# Patient Record
Sex: MALE | Race: OTHER
[De-identification: names, ages, dates, MRNs, and addresses within clinical notes are randomized per-mention and may not be internally consistent; named-entity substitution may affect disease eponyms.]

---

## 2018-09-06 ENCOUNTER — HOSPITAL ENCOUNTER (OUTPATIENT)
Dept: HOSPITAL 80 - FIMAGING | Age: 43
End: 2018-09-06
Attending: INTERNAL MEDICINE
Payer: MEDICAID

## 2018-09-06 DIAGNOSIS — M41.85: ICD-10-CM

## 2018-09-06 DIAGNOSIS — R07.9: Primary | ICD-10-CM

## 2018-09-06 DIAGNOSIS — Z87.891: ICD-10-CM

## 2018-09-06 DIAGNOSIS — F41.9: ICD-10-CM

## 2018-09-28 ENCOUNTER — HOSPITAL ENCOUNTER (INPATIENT)
Dept: HOSPITAL 80 - FED | Age: 43
LOS: 10 days | Discharge: HOME | DRG: 885 | End: 2018-10-08
Attending: PSYCHIATRY & NEUROLOGY | Admitting: PSYCHIATRY & NEUROLOGY
Payer: MEDICAID

## 2018-09-28 DIAGNOSIS — F20.9: Primary | ICD-10-CM

## 2018-09-28 DIAGNOSIS — I10: ICD-10-CM

## 2018-09-28 DIAGNOSIS — F17.200: ICD-10-CM

## 2018-09-28 DIAGNOSIS — Z59.0: ICD-10-CM

## 2018-09-28 DIAGNOSIS — T43.506A: ICD-10-CM

## 2018-09-28 DIAGNOSIS — Z23: ICD-10-CM

## 2018-09-28 DIAGNOSIS — F12.959: ICD-10-CM

## 2018-09-28 LAB — PLATELET # BLD: 372 10^3/UL (ref 150–400)

## 2018-09-28 PROCEDURE — G0009 ADMIN PNEUMOCOCCAL VACCINE: HCPCS

## 2018-09-28 PROCEDURE — G0480 DRUG TEST DEF 1-7 CLASSES: HCPCS

## 2018-09-28 PROCEDURE — G0008 ADMIN INFLUENZA VIRUS VAC: HCPCS

## 2018-09-28 RX ADMIN — NICOTINE SCH: 21 PATCH, EXTENDED RELEASE TOPICAL at 21:29

## 2018-09-28 SDOH — ECONOMIC STABILITY - HOUSING INSECURITY: HOMELESSNESS: Z59.0

## 2018-09-28 NOTE — [id number]
TLC Discharge Disposition

 

Disposition:                  Answers:  Admit                                 

Disposition Notes:            

Notes:

In consultation with [facility] ED physician, Agus Gonzales MD and on-call psychiatrist, Mykel Marquez MD, both concurred that pt appears to meet 27-65 criteria requiring psychiatric 

hospitalization as pt appears to be gravely disabled due to a mental illness condition. 

Was patient given the         Answers:  Yes                                   

Inpatient Behavioral                                                          

Health Prohibited                                                             

Belongings List while in                                                      

the ED?                                                                       

 

Date Signed:  09/28/2018 04:36 PM

Electronically Signed By:Dylan Geronimo

## 2018-09-28 NOTE — GHP
DATE OF ADMISSION:  09/28/2018



CHIEF COMPLAINT:  The patient is a 43-year-old male brought in to the emergency department by the j[facility] on an M1 hold due to being gravely disabled.



HISTORY OF PRESENT ILLNESS:  The patient is a 43-year-old homeless male who was brought into the [facility] department by police on an M1 hold as he was found to be in a paranoid delusional state with po
or self-care.  He apparently has declined psychotropic medications in the past.  He tells me that susana ortez did go well after he purchased multiple condominiums, and it turned into a "frat boy scene."  Sin
ce then, he has been homeless.  His thought pattern is tangential and disorganized.  He denies suicid
al or homicidal thoughts.  He is originally from [place], which is where his parents live.  He is 
evaluated by the Behavioral Health team in the emergency department and is going to be admitted to [facility] inpatient Behavioral Health Unit.



PAST MEDICAL HISTORY:  

1.  Schizophrenia.

2.  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



MEDICATIONS:  Please see [facility] for completed outpatient medication list.



ALLERGIES:  He has no known drug allergies.



FAMILY HISTORY:  Reviewed and noncontributory.



SOCIAL HISTORY:  The patient is currently homeless.  He is originally from [place], which is where
 his parents currently live.  It does not sound like he has a support system here locally.  He denies
 alcohol, tobacco, or drug use.



REVIEW OF SYSTEMS:  A 10-point review of systems was performed and is negative, except as per HPI.



OBJECTIVE:  VITAL SIGNS:  Temperature 36.6, blood pressure 167/96, heart rate 107, respiratory rate 1
8.  He is 98% on room air.  GENERAL:  The patient is awake, alert, and oriented.  He appears dishevel
ed and slightly agitated. HEENT:  Head is atraumatic, normocephalic.  Pupils are equal, round, reacti
ve to light.  Extraocular movements are intact.  Oropharynx is clear.  Mucous membranes are moist.  N
JOSELITO:  Supple.  There is no JVD.  HEART:  Regular rate and rhythm, without murmur.  LUNGS:  Clear to a
uscultation bilaterally.  ABDOMEN:  Soft, nondistended, nontender.  Positive bowel sounds.  EXTREMITI
ES:  Without cyanosis, clubbing, or edema. PSYCH:  The patient is slightly agitated, tangential, diso
rganized, and disheveled, as well as delusional.



LABORATORY DATA:  CBC reveals a normal white blood cell count, hemoglobin 19.1, blood glucose of 49. 
 Otherwise, basic metabolic panel is within normal limits.  Urine drug screen is negative.  Salicylat
e, acetaminophen, and alcohol are all negative.



ASSESSMENT/PLAN:  The patient is a 43-year-old male with a history of schizophrenia and hypertension,
 who presents to the emergency department on an M1 hold after being deemed gravely disabled, found by
 police in a paranoid and delusional state.

1.  Schizophrenia.  The patient is decompensated.  He will need inpatient psychiatric stabilization. 
 Will defer further management to the psych team.

2.  Hypertension.  His blood pressure is elevated on arrival.  He may have a history of underlying hy
pertension, but given his acutely decompensated mental health state, it is likely worth repeating thi
s, and if he has persistently elevated blood pressures, will consider starting him on lisinopril.

3.  Hypoglycemia.  This is mild.  The patient is asymptomatic.  He has since eaten since arrival to [facility] ED and appears stable at this time.

4.  Erythrocytosis.  There is no indication for phlebotomy.  There may be an element of hemoconcentra
tion.  I would follow this in the outpatient setting.

5.  Disposition.  The patient will be transferred from the ED to the inpatient Behavioral Health unit
 for psychiatric stabilization.





Job #:  [id number]

## 2018-09-28 NOTE — EDPHY
H & P


Stated Complaint: M1 from [facility]





- Personal History


Current Tetanus/Diphtheria Vaccine: Unsure


Current Tetanus Diphtheria and Acellular Pertussis (TDAP): Unsure





- Medical/Surgical History


Hx Asthma: No


Hx Chronic Respiratory Disease: No


Hx Diabetes: No


Hx Cardiac Disease: No


Hx Renal Disease: No


Hx Cirrhosis: No


Hx Alcoholism: No


Hx HIV/AIDS: No


Hx Splenectomy or Spleen Trauma: No


Other PMH: schitzophrenia, htn from caffeine use





- Social History


Smoking Status: Current every day smoker


Time Seen by Provider: 09/28/18 13:04


HPI/ROS: 





CHIEF COMPLAINT:  M1 hold gravely disabled





HISTORY OF PRESENT ILLNESS:  43-year-old male history of homelessness arrives 

from [facility] on an M1 hold for paranoid delusions poor self-care 

non adherence to mental health treatment gravely disabled due to schizophrenia.

  Denies suicidal or homicidal ideation.  Denies self-injurious behavior.  

Denies burning cutting or similar behavior.





PRIMARY CARE PROVIDER:  





REVIEW OF SYSTEMS:


10 systems reviewed and negative with the exception of the elements mentioned 

in the history of present illness








PAST MEDICAL & SURGICAL  HISTORY:  Schizophrenia


SOCIAL HISTORY: Homeless    














************


PHYSICAL EXAM





(Prior to examination, patient consented to physical exam, hands were washed 

and my usual and customary physical exam procedures followed)


1) GENERAL: poorly kept foul smelling, alert and oriented.  Appears to be in no 

acute distress.


2) HEAD: Normocephalic, atraumatic


3) HEENT: Pupils equal, round, reactive to light bilaterally.  Sclera 

anicteric.  [


4) NECK: Full range of motion, no meningeal signs.


5) LUNGS: Clear auscultation bilaterally, no wheezes, no rhonchi, no 

retractions.   


6) HEART: Regular rate and rhythm, no murmur, no heave, no gallop.


7) ABDOMEN: No guarding, no rebound, no focal tenderness,


8) MUSCULOSKELETAL: Moving all extremities, no focal areas of tenderness, no 

obvious trauma.  No peripheral edema or discoloration.


9) BACK:  No obvious trauma, no visual or palpable abnormality.] 


10) SKIN: No rash, no petechiae. 


11) Psychiatric:  Patient is oriented X 3, there is no agitation.








***************





DIFFERENTIAL DIAGNOSIS:    In no particular order including but not limited to 

suicidal ideation, homicidal ideation, psychosis, doroteo, gravely disabled


 (FELECIA Cao)


Constitutional: 


 Initial Vital Signs











Temperature (C)  36.6 C   09/28/18 13:02


 


Heart Rate  107 H  09/28/18 13:02


 


Respiratory Rate  18   09/28/18 13:02


 


Blood Pressure  167/96 H  09/28/18 13:02


 


O2 Sat (%)  98   09/28/18 13:02








 











O2 Delivery Mode               Room Air














Allergies/Adverse Reactions: 


 





No Known Allergies Allergy (Unverified 09/28/18 13:08)


 











Medical Decision Making


ED Course/Re-evaluation: 





The patient was evaluated and managed by the physician's assistant.  My 

cosignature indicates that I reviewed the chart and I agree with the findings 

and plan of care as documented.  I am the secondary supervising physician. (

Larissa Carbone)





1:28 p.m.:  Patient is on an M1 hold.  Will obtain laboratory studies and 

consult with mental health evaluator.  I saw this patient independently based 

on established practice protocols.  Care of patient under supervision of 

secondary supervising physician Dr Carbone.





4:50 p.m.:  Patient has been accepted for admission admitting physician Dr. Marquez, [facility]. EMTALA paperwork 

completed. (FELECIA Cao)





- Data Points


Laboratory Results: 


 Laboratory Results





 09/28/18 13:15 





 09/28/18 13:15 





 











  09/28/18 09/28/18 09/28/18





  13:15 13:15 12:30


 


WBC    8.34 10^3/uL 10^3/uL  





    (3.80-9.50)  


 


RBC    5.92 10^6/uL 10^6/uL  





    (4.40-6.38)  


 


Hgb    19.1 g/dL H g/dL  





    (13.7-17.5)  


 


Hct    54.1 % H %  





    (40.0-51.0)  


 


MCV    91.4 fL fL  





    (81.5-99.8)  


 


MCH    32.3 pg pg  





    (27.9-34.1)  


 


MCHC    35.3 g/dL g/dL  





    (32.4-36.7)  


 


RDW    11.7 % %  





    (11.5-15.2)  


 


Plt Count    372 10^3/uL 10^3/uL  





    (150-400)  


 


MPV    10.5 fL fL  





    (8.7-11.7)  


 


Neut % (Auto)    69.1 % %  





    (39.3-74.2)  


 


Lymph % (Auto)    22.4 % %  





    (15.0-45.0)  


 


Mono % (Auto)    7.3 % %  





    (4.5-13.0)  


 


Eos % (Auto)    0.4 % L %  





    (0.6-7.6)  


 


Baso % (Auto)    0.4 % %  





    (0.3-1.7)  


 


Nucleat RBC Rel Count    0.0 % %  





    (0.0-0.2)  


 


Absolute Neuts (auto)    5.77 10^3/uL 10^3/uL  





    (1.70-6.50)  


 


Absolute Lymphs (auto)    1.87 10^3/uL 10^3/uL  





    (1.00-3.00)  


 


Absolute Monos (auto)    0.61 10^3/uL 10^3/uL  





    (0.30-0.80)  


 


Absolute Eos (auto)    0.03 10^3/uL 10^3/uL  





    (0.03-0.40)  


 


Absolute Basos (auto)    0.03 10^3/uL 10^3/uL  





    (0.02-0.10)  


 


Absolute Nucleated RBC    0.00 10^3/uL 10^3/uL  





    (0-0.01)  


 


Immature Gran %    0.4 % %  





    (0.0-1.1)  


 


Immature Gran #    0.03 10^3/uL 10^3/uL  





    (0.00-0.10)  


 


Sodium  141 mEq/L mEq/L    





   (135-145)   


 


Potassium  3.7 mEq/L mEq/L    





   (3.3-5.0)   


 


Chloride  97 mEq/L mEq/L    





   ()   


 


Carbon Dioxide  31 mEq/l mEq/l    





   (22-31)   


 


Anion Gap  13 mEq/L mEq/L    





   (8-16)   


 


BUN  13 mg/dL mg/dL    





   (7-23)   


 


Creatinine  0.8 mg/dL mg/dL    





   (0.7-1.3)   


 


Estimated GFR  > 60     





    


 


Glucose  49 mg/dL L mg/dL    





   ()   


 


Calcium  9.9 mg/dL mg/dL    





   (8.5-10.4)   


 


Salicylates  < 1.0 mg/dL L mg/dL    





   (2.0-20.0)   


 


Urine Opiates Screen      NEGATIVE 





     (NEGATIVE) 


 


Acetaminophen  < 10 mcg/mL L mcg/mL    





   (10-30)   


 


Urine Barbiturates      NEGATIVE 





     (NEGATIVE) 


 


Ur Phencyclidine Scrn      NEGATIVE 





     (NEGATIVE) 


 


Ur Amphetamine Screen      NEGATIVE 





     (NEGATIVE) 


 


U Benzodiazepines Scrn      NEGATIVE 





     (NEGATIVE) 


 


Urine Cocaine Screen      NEGATIVE 





     (NEGATIVE) 


 


U Marijuana (THC) Screen      NEGATIVE 





     (NEGATIVE) 


 


Ethyl Alcohol  < 10 mg/dL mg/dL    





   (0-10)   














Departure





- Departure


Disposition: [facility] IP


Clinical Impression: 


 Schizophrenia





Condition: Fair


Referrals: 


Tank Escobedo MD [Primary Care Provider] - As per Instructions

## 2018-09-29 RX ADMIN — NICOTINE SCH MG: 21 PATCH, EXTENDED RELEASE TOPICAL at 08:57
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
## 2018-09-29 NOTE — ASMTCMCOM
CM Note

 

CM Note                       

Notes:

Pt. and CC completed MTP, signed and placed in chart. 



Pt. reports having a court date "in a couple months". Pt. declined to elaborate on why he is going 

to court. Pt. stated he drinks alcohol "not that frequently", adding it calms him down. Pt. reports 


smoking THC "pretty frequently, but not everyday". Pt. denied SI, HI, AVH and paranoia. Pt. reports 


his last hospitalization being 15 years ago at [facility]. Pt. stated he couldn't remember his 

diagnosis and was unable to name any helpful treatments or medications. 



Pt. presents as alert, anxious,  pressured speech, appearing delayed at times, guarded, and with 

poor eye contact. Staff report pt. sleeping 9 hours. Pt. rated his SI a 0/10 with RN. Pt.'s M1 hold 


will  on 10/1/18 at 1200.

 

Date Signed:  2018 01:06 PM

Electronically Signed By:Marcia Seay
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
## 2018-09-30 RX ADMIN — NICOTINE SCH MG: 21 PATCH, EXTENDED RELEASE TOPICAL at 08:58

## 2018-09-30 RX ADMIN — NICOTINE SCH: 21 PATCH, EXTENDED RELEASE TOPICAL at 13:59

## 2018-09-30 NOTE — SOAPPROG
SOAP Progress Note


Assessment/Plan: 


Assessment:


44yo with hx szp and thc, noncompliance with treatment, and exacerbation of sxs 

resulting in recent homelessness and legal charges








18 15:18


slept 9.5hr.


Note pt more isolative today, remaining in room. Room with w water over 

bathroom floor, clothes strewn on floor. 


cooperative, good eye contact, brief responses to questions. seems a bit more 

guarded. mood "fine" affect restricted. thoughts with goal directed responses. 

denied current AH. denied SI. Noted in hallway smiling to self with head down 

as walking to room, ?internal preoccupation. 


 


PLAN:


Pt states no s/e to low dose Abilify this AM. Accepts offer for increase, even 

taking another 2mg this afternoon and then increase to 5mg qam start tomorrow. 

briefly reviewed metabolic and extrapyramidal risks with pt who expressed 

understanding and willingness to continue with treatment. 


Still interested in long-acting injectable. 





Has not allowed MICHELLE to parents. Presented to hosp apparently at encouragement 

of parents who support him financially, and have encouraged the [facility] for 

compliance, may be a conditional arrangement to continue their support. need to 

explore this.


 


Informed his M-1 would  tomorrow and I am inclined to place on [facility]. Pt 

reports he was on this at [facility] 15 yrs ago and was there for 3 months. 

Estimated stay would be about 7-10days, and pt reports he has no problems 

staying voluntary if his stay here was "that short" (comparing to [place]). Again 

asks if okay to stay voluntarily. In this case, will defer to primary team 

tomorrow, also pending collateral from family. 








Objective: 





 Vital Signs











Temp Pulse Resp BP Pulse Ox


 


 36.6 C   76   14   113/67   97 


 


 18 06:00  18 06:00  18 06:00  18 06:00  18 06:00














- Pending Discharge


Pending Discharge Within 24 Hours: No


Pending Discharge Within 48 Hours: No





ICD10 Worksheet


Patient Problems: 


 Problems











Problem Status Onset


 


Schizophrenia Acute

## 2018-09-30 NOTE — PDMN
Medical Necessity


Medical necessity: Pt meets IP criteria per MD & [facility]CG B-014-IP; est los > 2mn 

for eval/tx of acute schizophrenia exacerbation; pt gravely disabled & on M1 

hold; admit for further monitoring, safety, med management & stabilization; hx 

homelessness; per H&P & order 9/29/18

## 2018-10-01 RX ADMIN — NICOTINE SCH MG: 21 PATCH, EXTENDED RELEASE TOPICAL at 08:38
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
## 2018-10-01 NOTE — SOAPPROG
SOAP Progress Note


Assessment/Plan: 


Assessment:





Bipolar Disorder, severe, current doroteo, with mood congruent psychotic 

features.  Cannabis Use Disorder, Severe.  Non-adherence to medical treatment.  

Nicotine dependence with withdrawal.  Slight improvement noted. (see subjective/

objective note).  Patient is not safe to discharge at this time as patient 

continues to exhibit signs of doroteo, and express doroteo symptoms.  Patient 

requires continued inpatient care because of current acute doroteo, and requires 

inpatient level of care to stabilize in order to no longer be gravely disabled 

due to mental illness.  Patient could benefit from continued inpatient 

hospitalization for crisis stabilization, safety, and medication evaluation.  

Patient could benefit from STEIN as patient has history of non-adherence to oral 

medications.  STEIN to be administered on Thursday.  





Plan:





(1) Psychotropic medications: After reviewing options, risks, and benefits 

patient agrees to continue current medications and agrees to increase Abilify 

to 10 mg po QD and Abilify Maintenna 400 mg IM on Thursday.  No medication 

changes at this time as more time is needed to determine ongoing tolerability 

and efficacy.  Plan is to continue to observe patient for response and side 

effects from medications, and ongoing monitoring and evaluation.  


(2) Review with patient informed consent and recommendations for psychotropic 

medication treatment listed below


(3) Labs: A1c, liver function, fasting lipid panel


(4) Therapy: continue milieu and group therapy  


(5) Further investigation including gathering information from patients 

relatives and review of past case records to inform treatment plan.


(6) Safety/Wellness plan and follow-up outpatient appointments to be 

established prior to discharge.  Next steps are for patient to meet with care 

manager to plan a safe discharge plan and establish outpatient services for 

ongoing treatment.  


(7) Confer with inpatient treatment team regarding treatment plan.  


(8) Legal status: M1, to sign in when M1 expires


(9) Consider discharge on Friday if patient is in stable condition, safe, and 

has a safe discharge plan.   


(10) Substance abuse intervention: Cannabis





PSYCHOTROPIC MEDICATION TREATMENT INFORMED CONSENT and RECOMMENDATIONS: Review 

nature of condition, diagnosis, and prognosis.  Review nature and purpose of 

psychotropic medication treatment.  Review type of psychotropic medications 

being ordered.  Review risk and benefits of psychotropic medication treatment.  

Review probable length of time patient will need to take medications.  Review 

risk and benefits of not undergoing psychotropic medication treatment.  Review 

alternative treatments to psychotropic medications.  Review psychotropic 

medications contraindications, drug-drug interactions, side effects, and 

importance of reporting any side effects to a psychiatric provider or nurse 

during inpatient hospitalization, and upon discharge to patients psychiatric 

outpatient provider, primary care provider, or other health care professional.  

Review importance of asking a nurse, psychiatric provider, or primary care 

provider any questions or problems concerning the psychotropic medications.  

Verify patient understands the information that has been provided, and 

understands, accepts, and agrees to psychotropic medications.  





Review patients safety plan and importance of patient to report to staff while 

hospitalized if patient is ever a danger to self/others, or unable to care for 

self, and upon discharge, the importance for patient to contact [facility] or [facility], or go to the nearest emergency room, if patient is ever a 

danger to self/others, or unable to care for self.  Recommend that upon 

discharge patient establish medication management treatment with a psychiatric 

provider, establishes routine therapy appointments, and follow-up with primary 

care provider.  Verify patient understands and agrees to these recommendations.





10/01/18 09:00





Subjective: 


Following up with patient for evaluation of doroteo, psychosis, and safety.  

Patient reports, "Doing well, doing great actually.  Yes, here because just 

think I probably smoke too many cigarettes and drink too much coffee.  My 

parents want me to start a long acting medication, and I am okay with that, I 

guess.  I have been on medications in the past for bipolar."  Patient expresses 

the following psychiatric symptoms racing thoughts, pressured to keep talking.  

Patient reports taking medications as prescribed, and describes response to 

medications as good.  Patient does not report undesirable side effects from the 

medications, and agrees to continue current medications.  Patient reports 

appetite as good, and reports eating all meals.  Patient describes getting 8 

hours of sleep.  Patient agrees to Abilify 10 mg po QD and Abilify Maintenna to 

be administered on Thursday.





Objective: 





 Vital Signs











Temp Pulse Resp BP Pulse Ox


 


 36.7 C   66   16   105/62   96 


 


 10/01/18 06:00  10/01/18 06:00  10/01/18 06:00  10/01/18 06:00  10/01/18 06:00








NURSING REPORT: Consulted with nursing for update on patients progress in 

treatment.  Nurses report patient is engaged in treatment, is attending groups, 

slept 8 hours, expresses the following psychiatric symptoms: pressure to keep 

talking, racing thoughts; exhibits the following psychiatric symptoms: 

hypertalkative, hyperactivity, tangential; is eating all meals, is agreeable to 

medications and taking as prescribed with no report of side effects, with no s/

s of EPS/akathisia, and denies SI/HI, denies A/V hallucinations, and denies 

delusions.





CASE COORDINATOR UPDATE: establishing discharge plan with [facility] 

for follow-up after discharge.  Case coordinator reports patient's OP 

psychiatrist had planned to begin Abilify Maintenna 400 mg IM STEIN prior to 

patient's decompensation due to non-adherence to oral medications that led to 

this hospitalization.





MSE: The patient is a well-nourished male looking stated chronological age.  

Attire is appropriate and dress is casual and is neat.  Grooming status is 

appropriate and neat.  Ambulation is independent.  Gait is normal and 

coordinated.  Posture is normal and relaxed.  Eye contact is appropriate, and 

adequate.  Motor activity is appropriate with purposeful, organized, 

coordinated movements; with no involuntary movements.  Attitude is cooperative 

and friendly.  Patient appears distractible and does not relate well to this 

interviewer.  Language production is spontaneous.  Rate if pressured and non-

stop, rhythm is fast, and volume is loud; amount is hypertalkative.  Patient 

difficult to interrupt and interrupts this NP during interview.  Articulation 

is clear.  Patient reports mood as okay with incongruent and expansive affect.  

Patients thought process is non-linear and illogical, with loose associations, 

tangential thought.  Associations are loose.  Patient does not report suicidal/

homicidal thoughts, ideas, or plans.  Patient denies auditory, visual 

hallucinations.  Patient denies delusions.  Patient does not appear to be 

attending to internal stimuli.  Patients attention and concentration are poor.  

Patient is oriented to person, place, time, and situation.  Patients insight is 

poor.  Patients judgment is poor.  No evidence of gross cognitive dysfunction 

at any point during the interview.  No evidence of apparent dysfunction in 

recent or remote memory.





SUBSTANCE ABUSE BRIEF INTERVENTION: Brief intervention regarding the risks of 

cannabis abuse is provided to patient with goal to reduce the risk of harm that 

could result from the continued use of methamphetamine and cannabis, with the 

general aim to investigate the problem, raise awareness of problem, develop a 

solution with the patient, recommend a specific change or activity, and 

motivate the patient toward change.  Assess substance abuse behavior and give 

supportive advice about harm reduction, recommend a reduction in hazardous/at-

risk consumption patterns, and facilitate referrals for additional specialized 

treatment with care coordinator.  Intermediate goal is for the patient to quit 

use and attend NA meetings and OP substance abuse treatment.  Intervention 

focus on intermediate goals to allow for more immediate success in the 

treatment process to keep the patient motivated.  Review following with patient

: Cannabis use risks: Short-term use: impaired short-term memory, impaired 

motor coordination, altered judgement, in high doses paranoia and psychosis.  

Long-term use addiction, diminished life satisfaction and achievement, symptoms 

of chronic bronchitis, and increased risk of chronic psychosis disorders if 

predisposition to such disorders.  In withdrawal anger, aggression irritability

, anxiety and nervousness, decreased appetite or weight loss, restlessness, and 

sleep difficulties with strange dreams.  











- Time Spent With Patient


Time Spent With Patient: 


15 minutes, met with patient individually.








- Pending Discharge


Pending Discharge Within 24 Hours: No


Pending Discharge Within 48 Hours: No





ICD10 Worksheet


Patient Problems: 


 Problems











Problem Status Onset


 


Schizophrenia Acute

## 2018-10-02 RX ADMIN — NICOTINE SCH: 21 PATCH, EXTENDED RELEASE TOPICAL at 10:42
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
## 2018-10-02 NOTE — ASMTCMCOM
CM Note

 

CM Note                       

Notes:

Pt. reports feeling "pretty good". Pt. stated he "slept pretty good". Pt. reports eating 

well. Pt. stated he is attending some groups, and is going to try to attend more today. Pt. reports 


no issues with his current medications. Pt. and CC called [employer] to assist pt. in quitting 

smoking. Pt. denied SI, HI, AVH and paranoia. 



Pt. presents as alert, nervous, guarded, constricted, poor eye contact,  and at time ridged in his 

movements. Staff report pt. sleeping 13 hours, being medication complaint, and schedule for an 

Abilify shot on Thursday. 

 

Date Signed:  10/02/2018 12:20 PM

Electronically Signed By:Marcia Seay

## 2018-10-02 NOTE — SOAPPROG
SOAP Progress Note


Assessment/Plan: 


Assessment:





Bipolar Disorder, severe, current doroteo, with mood congruent psychotic 

features.  Cannabis Use Disorder, Severe.  Non-adherence to medical treatment.  

Nicotine dependence with withdrawal.  Slight improvement noted. (see subjective/

objective note).  Patient is not safe to discharge at this time as patient 

continues to exhibit signs of doroteo, and express doroteo symptoms.  Patient 

requires continued inpatient care because of current acute doroteo, and requires 

inpatient level of care to stabilize in order to no longer be gravely disabled 

due to mental illness.  Patient could benefit from continued inpatient 

hospitalization for crisis stabilization, safety, and medication evaluation.  

Patient could benefit from STEIN as patient has history of non-adherence to oral 

medications.  STEIN to be administered on Thursday.  





Plan:





(1) Psychotropic medications: After reviewing options, risks, and benefits 

patient agrees to continue current medications and agrees to Abilify Maintenna 

400 mg IM on Thursday.  No medication changes at this time as more time is 

needed to determine ongoing tolerability and efficacy.  Plan is to continue to 

observe patient for response and side effects from medications, and ongoing 

monitoring and evaluation.  


(2) Review with patient informed consent and recommendations for psychotropic 

medication treatment listed below


(3) Labs: no additional labs at this time


(4) Therapy: continue milieu and group therapy  


(5) Further investigation including gathering information from patients 

relatives and review of past case records to inform treatment plan.


(6) Safety/Wellness plan and follow-up outpatient appointments to be 

established prior to discharge.  Next steps are for patient to meet with care 

manager to plan a safe discharge plan and establish outpatient services for 

ongoing treatment.  


(7) Confer with inpatient treatment team regarding treatment plan.  


(8) Legal status: M1, to sign in voluntary when M1 expires


(9) Consider discharge on Friday if patient is in stable condition, safe, and 

has a safe discharge plan.   


(10) Substance abuse intervention: cannabis 





PSYCHOTROPIC MEDICATION TREATMENT INFORMED CONSENT and RECOMMENDATIONS: Review 

nature of condition, diagnosis, and prognosis.  Review nature and purpose of 

psychotropic medication treatment.  Review type of psychotropic medications 

being ordered.  Review risk and benefits of psychotropic medication treatment.  

Review probable length of time patient will need to take medications.  Review 

risk and benefits of not undergoing psychotropic medication treatment.  Review 

alternative treatments to psychotropic medications.  Review psychotropic 

medications contraindications, drug-drug interactions, side effects, and 

importance of reporting any side effects to a psychiatric provider or nurse 

during inpatient hospitalization, and upon discharge to patients psychiatric 

outpatient provider, primary care provider, or other health care professional.  

Review importance of asking a nurse, psychiatric provider, or primary care 

provider any questions or problems concerning the psychotropic medications.  

Verify patient understands the information that has been provided, and 

understands, accepts, and agrees to psychotropic medications.  





Review patients safety plan and importance of patient to report to staff while 

hospitalized if patient is ever a danger to self/others, or unable to care for 

self, and upon discharge, the importance for patient to contact [facility] or [facility], or go to the nearest emergency room, if patient is ever a 

danger to self/others, or unable to care for self.  Recommend that upon 

discharge patient establish medication management treatment with a psychiatric 

provider, establishes routine therapy appointments, and follow-up with primary 

care provider.  Verify patient understands and agrees to these recommendations.





10/02/18 08:52





Subjective: 


Following up with patient for evaluation of doroteo, psychosis, and safety.  

Patient reports, "Doing good."  Patient expresses the following psychiatric 

symptoms racing thoughts.  Patient reports taking medications as prescribed, 

and describes response to medications as good.  Patient does not report 

undesirable side effects from the medications, and agrees to continue current 

medications.  Patient reports appetite as good, and reports eating all meals.  

Patient describes getting 8 hours of sleep.  Patient agrees to Abilify 10 mg po 

QD and Abilify Maintenna 400 mg IM to be administered on Thursday.  Patient 

expresses history of non-adherence to oral medications and agrees to plan to 

start STEIN to reduce risk of decompensation due to not taking oral medications 

as prescribed.





Objective: 





 Vital Signs











Temp Pulse Resp BP Pulse Ox


 


 36.5 C   57 L  16   115/53 L  96 


 


 10/02/18 06:00  10/02/18 06:00  10/02/18 06:00  10/02/18 06:00  10/02/18 06:00








NURSING REPORT: Consulted with nursing for update on patients progress in 

treatment.  Nurses report patient is engaged in treatment, is attending groups, 

slept 10 hours, expresses the following psychiatric symptoms: pressure to keep 

talking, racing thoughts; exhibits the following psychiatric symptoms: 

hypertalkative, hyperactivity, tangential; is eating all meals, is agreeable to 

medications and taking as prescribed with no report of side effects, with no s/

s of EPS/akathisia, and denies SI/HI, denies A/V hallucinations, and denies 

delusions.





CASE COORDINATOR UPDATE: establishing discharge plan with [facility] 

for follow-up after discharge





MSE: The patient is a well-nourished male looking stated chronological age.  

Attire is appropriate and dress is casual and is neat.  Grooming status is 

appropriate and neat.  Ambulation is independent.  Gait is normal and 

coordinated.  Posture is normal and relaxed.  Eye contact is appropriate, and 

adequate.  Motor activity is appropriate with purposeful, organized, 

coordinated movements; with no involuntary movements.  Attitude is cooperative 

and friendly.  Patient appears distractible and does not relate well to this 

interviewer.  Language production is spontaneous.  Rate is pressured and non-

stop, rhythm is fast, and volume is loud; amount is hypertalkative.  Patient 

difficult to interrupt and often interrupts this NP during interview.  

Articulation is clear.  Patient reports mood as okay with incongruent and 

expansive affect.  Patients thought process is non-linear and illogical, with 

loose associations, tangential thought.  Associations are loose.  Patient does 

not report suicidal/homicidal thoughts, ideas, or plans.  Patient denies 

auditory, visual hallucinations.  Patient denies delusions.  Patient does not 

appear to be attending to internal stimuli.  Patients attention and 

concentration are poor.  Patient is oriented to person, place, time, and 

situation.  Patients insight is poor.  Patients judgment is poor.  No evidence 

of gross cognitive dysfunction at any point during the interview.  No evidence 

of apparent dysfunction in recent or remote memory.





COGNITIVE FUNCTION:


Retention / Recall:  repeat back these numbers:    5 1 5 0 3     /    9 6 8 3 6 

4 2  - recall without difficulty 


Abstractions: What does the statement Rolling stones gather no cook mean to you

?  "Moving on things a lot you don't become to boring or lazy."


                   How is an airplane similar to a bird?  "Both fly" 


Memory: Remember these 3 items, ask to repeat back:  Pin, Car, Duck.   - recall 

after 3 minutes without difficulty


Judgement:  If you were in a restaurant and heard a fire alarm go off, what 

would you do?  - "exit at the emergency exit"


Orientation: x4


Calculations:  3 x 7     Count backwards by 3 or 7 starting from 100  - both 

without difficulty


Knowledge: adequate for level of education





SUBSTANCE ABUSE BRIEF INTERVENTION: Brief intervention regarding the risks of 

cannabis abuse is provided to patient with goal to reduce the risk of harm that 

could result from the continued use of methamphetamine and cannabis, with the 

general aim to investigate the problem, raise awareness of problem, develop a 

solution with the patient, recommend a specific change or activity, and 

motivate the patient toward change.  Assess substance abuse behavior and give 

supportive advice about harm reduction, recommend a reduction in hazardous/at-

risk consumption patterns, and facilitate referrals for additional specialized 

treatment with care coordinator.  Intermediate goal is for the patient to quit 

use and attend NA meetings and OP substance abuse treatment.  Intervention 

focus on intermediate goals to allow for more immediate success in the 

treatment process to keep the patient motivated.  Review following with patient

: Cannabis use risks: Short-term use: impaired short-term memory, impaired 

motor coordination, altered judgement, in high doses paranoia and psychosis.  

Long-term use addiction, diminished life satisfaction and achievement, symptoms 

of chronic bronchitis, and increased risk of chronic psychosis disorders if 

predisposition to such disorders.  In withdrawal anger, aggression irritability

, anxiety and nervousness, decreased appetite or weight loss, restlessness, and 

sleep difficulties with strange dreams.  











- Time Spent With Patient


Time Spent With Patient: 


15 minutes, met with patient individually.








- Pending Discharge


Pending Discharge Within 24 Hours: No


Pending Discharge Within 48 Hours: No





ICD10 Worksheet


Patient Problems: 


 Problems











Problem Status Onset


 


Cannabis use disorder, severe, dependence Acute  


 


Nicotine dependence with withdrawal Acute  


 


Non-adherence to medical treatment Acute  


 


Severe bipolar disorder with psychotic features, mood-congruent Acute

## 2018-10-03 RX ADMIN — NICOTINE SCH: 21 PATCH, EXTENDED RELEASE TOPICAL at 08:26

## 2018-10-03 NOTE — ASMTCMCOM
CM Note

 

CM Note                       

Notes:

Client will be put on a different STEIN, per provider based off of his out-side providers knowledge 

of the client, etc.  Also, client isolates at times and would not speak to this writer today.*

 

Date Signed:  10/03/2018 03:42 PM

Electronically Signed By:Jose Jensen

## 2018-10-03 NOTE — SOAPPROG
SOAP Progress Note


Assessment/Plan: 


Assessment:





Bipolar Disorder, severe, current doroteo, with mood congruent psychotic 

features.  Cannabis Use Disorder, Severe.  Non-adherence to medical treatment.  

Nicotine dependence with withdrawal.  Slight improvement noted; notably 

improved sleep (see subjective/objective note).  Patient is not safe to 

discharge at this time as patient continues to exhibit signs of doroteo, and 

express doroteo symptoms.  Patient requires continued inpatient care because of 

current acute doroteo, and requires inpatient level of care to stabilize in order 

to no longer be gravely disabled due to mental illness.  Patient could benefit 

from continued inpatient hospitalization for crisis stabilization, safety, and 

medication evaluation.  Patient could benefit from STEIN as patient has history 

of non-adherence to oral medications.  STEIN to be administered on Thursday.  





Plan:





(1) Psychotropic medications: After reviewing options, risks, and benefits 

patient agrees to continue current medications and agrees to Abilify Maintenna 

400 mg IM on Thursday.  No medication changes at this time as more time is 

needed to determine ongoing tolerability and efficacy.  Plan is to continue to 

observe patient for response and side effects from medications, and ongoing 

monitoring and evaluation.  


(2) Review with patient informed consent and recommendations for psychotropic 

medication treatment listed below


(3) Labs: no additional labs at this time


(4) Therapy: continue milieu and group therapy  


(5) Further investigation including gathering information from patients 

relatives and review of past case records to inform treatment plan.


(6) Safety/Wellness plan and follow-up outpatient appointments to be 

established prior to discharge.  Next steps are for patient to meet with care 

manager to plan a safe discharge plan and establish outpatient services for 

ongoing treatment.  


(7) Confer with inpatient treatment team regarding treatment plan.  


(8) Legal status: voluntary 


(9) Consider discharge on Friday if patient is in stable condition, safe, and 

has a safe discharge plan.   





PSYCHOTROPIC MEDICATION TREATMENT INFORMED CONSENT and RECOMMENDATIONS: Review 

nature of condition, diagnosis, and prognosis.  Review nature and purpose of 

psychotropic medication treatment.  Review type of psychotropic medications 

being ordered.  Review risk and benefits of psychotropic medication treatment.  

Review probable length of time patient will need to take medications.  Review 

risk and benefits of not undergoing psychotropic medication treatment.  Review 

alternative treatments to psychotropic medications.  Review psychotropic 

medications contraindications, drug-drug interactions, side effects, and 

importance of reporting any side effects to a psychiatric provider or nurse 

during inpatient hospitalization, and upon discharge to patients psychiatric 

outpatient provider, primary care provider, or other health care professional.  

Review importance of asking a nurse, psychiatric provider, or primary care 

provider any questions or problems concerning the psychotropic medications.  

Verify patient understands the information that has been provided, and 

understands, accepts, and agrees to psychotropic medications.  





Review patients safety plan and importance of patient to report to staff while 

hospitalized if patient is ever a danger to self/others, or unable to care for 

self, and upon discharge, the importance for patient to contact [facility] or [facility], or go to the nearest emergency room, if patient is ever a 

danger to self/others, or unable to care for self.  Recommend that upon 

discharge patient establish medication management treatment with a psychiatric 

provider, establishes routine therapy appointments, and follow-up with primary 

care provider.  Verify patient understands and agrees to these recommendations.





10/03/18 06:42





Subjective: 


Following up with patient for evaluation of doroteo, psychosis, and safety.  

Patient reports, "Doing well, slept better last night."  Patient expresses the 

following psychiatric symptoms racing thoughts.  Patient reports taking 

medications as prescribed, and describes response to medications as "good."  

Patient does not report undesirable side effects from the medications, and 

agrees to continue current medications.  Patient reports appetite as good, and 

reports eating all meals.  Patient describes getting 8 hours of sleep.  Patient 

agrees to continue Abilify 10 mg po QD and Abilify Maintenna 400 mg IM to be 

administered on Thursday.  





Objective: 





 Vital Signs











Temp Pulse Resp BP Pulse Ox


 


 36.9 C   70   14   113/57 L  94 


 


 10/03/18 06:00  10/03/18 06:00  10/03/18 06:00  10/03/18 06:00  10/03/18 06:00








NURSING REPORT: Consulted with nursing for update on patients progress in 

treatment.  Nurses report patient is engaged in treatment, is attending groups, 

slept 8 hours, expresses the following psychiatric symptoms: pressure to keep 

talking, racing thoughts; exhibits the following psychiatric symptoms: 

hypertalkative, hyperactivity, tangential; is eating all meals, is agreeable to 

medications and taking as prescribed with no report of side effects, with no s/

s of EPS/akathisia, and denies SI/HI, denies A/V hallucinations, and denies 

delusions.





CASE COORDINATOR UPDATE: establishing discharge plan with [facility] 

for follow-up after discharge





MSE: The patient is a well-nourished male looking stated chronological age.  

Attire is appropriate and dress is casual and is neat.  Grooming status is 

appropriate and neat.  Ambulation is independent.  Gait is normal and 

coordinated.  Posture is normal and relaxed.  Eye contact is appropriate, and 

adequate.  Motor activity is appropriate with purposeful, organized, 

coordinated movements; with no involuntary movements.  Attitude is cooperative 

and friendly.  Patient appears distractible and does not relate well to this 

interviewer.  Language production is spontaneous.  Rate if pressured and non-

stop, rhythm is fast, and volume is loud; amount is hypertalkative.  Patient 

difficult to interrupt and interrupts this NP during interview.  Articulation 

is clear.  Patient reports mood as okay with incongruent and expansive affect.  

Patients thought process is non-linear and illogical, with loose associations, 

tangential thought.  Associations are loose.  Patient does not report suicidal/

homicidal thoughts, ideas, or plans.  Patient denies auditory, visual 

hallucinations.  Patient denies delusions.  Patient does not appear to be 

attending to internal stimuli.  Patients attention and concentration are poor.  

Patient is oriented to person, place, time, and situation.  Patients insight is 

poor.  Patients judgment is poor.  No evidence of gross cognitive dysfunction 

at any point during the interview.  No evidence of apparent dysfunction in 

recent or remote memory.





SUBSTANCE ABUSE BRIEF INTERVENTION: Brief intervention regarding the risks of 

cannabis abuse is provided to patient with goal to reduce the risk of harm that 

could result from the continued use of methamphetamine and cannabis, with the 

general aim to investigate the problem, raise awareness of problem, develop a 

solution with the patient, recommend a specific change or activity, and 

motivate the patient toward change.  Assess substance abuse behavior and give 

supportive advice about harm reduction, recommend a reduction in hazardous/at-

risk consumption patterns, and facilitate referrals for additional specialized 

treatment with care coordinator.  Intermediate goal is for the patient to quit 

use and attend NA meetings and OP substance abuse treatment.  Intervention 

focus on intermediate goals to allow for more immediate success in the 

treatment process to keep the patient motivated.  Review following with patient

: Cannabis use risks: Short-term use: impaired short-term memory, impaired 

motor coordination, altered judgement, in high doses paranoia and psychosis.  

Long-term use addiction, diminished life satisfaction and achievement, symptoms 

of chronic bronchitis, and increased risk of chronic psychosis disorders if 

predisposition to such disorders.  In withdrawal anger, aggression irritability

, anxiety and nervousness, decreased appetite or weight loss, restlessness, and 

sleep difficulties with strange dreams.





- Time Spent With Patient


Time Spent With Patient: 


15 minutes, met with patient individually.








- Pending Discharge


Pending Discharge Within 24 Hours: No


Pending Discharge Within 48 Hours: Yes


Pending Discharge Date: 10/05/18


Pending Discharge Time: 11:00





ICD10 Worksheet


Patient Problems: 


 Problems











Problem Status Onset


 


Cannabis use disorder, severe, dependence Acute  


 


Nicotine dependence with withdrawal Acute  


 


Non-adherence to medical treatment Acute  


 


Severe bipolar disorder with psychotic features, mood-congruent Acute

## 2018-10-04 RX ADMIN — NICOTINE SCH: 21 PATCH, EXTENDED RELEASE TOPICAL at 08:12

## 2018-10-04 RX ADMIN — PALIPERIDONE SCH MG: 3 TABLET, EXTENDED RELEASE ORAL at 08:13

## 2018-10-04 NOTE — SOAPPROG
SOAP Progress Note


Assessment/Plan: 


Assessment:





Schizophrenia.  Cannabis Use Disorder, Severe.  Non-adherence to medical 

treatment.  Nicotine dependence with withdrawal.  R/O Bipolar Disorder, severe, 

current doroteo, with mood congruent psychotic features.  Slight improvement noted

; notably improved sleep (see subjective/objective note).  After further 

observation, patient is not showing signs of doroteo no expressing symptoms of 

doroteo, and presents with s/s indicative of schizophrenia.  Change working dx to 

schizophrenia and treat accordingly.  OP psychiatrist reports history of 

schizophrenia.  Patient is not safe to discharge at this time as patient 

continues to exhibit signs of psychosis, and express psychosis symptoms.  

Patient requires continued inpatient care because of current acute psychosis, 

and requires inpatient level of care to stabilize in order to no longer be 

gravely disabled due to mental illness.  Patient could benefit from continued 

inpatient hospitalization for crisis stabilization, safety, and medication 

evaluation.  Patient could benefit from STEIN as patient has history of non-

adherence to oral medications.  STEIN to be administered on Friday.  





Plan:





(1) Psychotropic medications: After reviewing options, risks, and benefits 

patient agrees to continue current medications and agrees to STEIN on Friday.  No 

medication changes at this time as more time is needed to determine ongoing 

tolerability and efficacy.  Plan is to continue to observe patient for response 

and side effects from medications, and ongoing monitoring and evaluation.  


(2) Review with patient informed consent and recommendations for psychotropic 

medication treatment listed below


(3) Labs: no additional labs at this time


(4) Therapy: continue milieu and group therapy  


(5) Further investigation including gathering information from patients 

relatives and review of past case records to inform treatment plan.


(6) Safety/Wellness plan and follow-up outpatient appointments to be 

established prior to discharge.  Next steps are for patient to meet with care 

manager to plan a safe discharge plan and establish outpatient services for 

ongoing treatment.  


(7) Confer with inpatient treatment team regarding treatment plan.  


(8) Legal status: voluntary 


(9) Consider discharge on Friday if patient is in stable condition, safe, and 

has a safe discharge plan.   


(10) Substance abuse intervention: cannabis





PSYCHOTROPIC MEDICATION TREATMENT INFORMED CONSENT and RECOMMENDATIONS: Review 

nature of condition, diagnosis, and prognosis.  Review nature and purpose of 

psychotropic medication treatment.  Review type of psychotropic medications 

being ordered.  Review risk and benefits of psychotropic medication treatment.  

Review probable length of time patient will need to take medications.  Review 

risk and benefits of not undergoing psychotropic medication treatment.  Review 

alternative treatments to psychotropic medications.  Review psychotropic 

medications contraindications, drug-drug interactions, side effects, and 

importance of reporting any side effects to a psychiatric provider or nurse 

during inpatient hospitalization, and upon discharge to patients psychiatric 

outpatient provider, primary care provider, or other health care professional.  

Review importance of asking a nurse, psychiatric provider, or primary care 

provider any questions or problems concerning the psychotropic medications.  

Verify patient understands the information that has been provided, and 

understands, accepts, and agrees to psychotropic medications.  





Review patients safety plan and importance of patient to report to staff while 

hospitalized if patient is ever a danger to self/others, or unable to care for 

self, and upon discharge, the importance for patient to contact [facility] or [facility], or go to the nearest emergency room, if patient is ever a 

danger to self/others, or unable to care for self.  Recommend that upon 

discharge patient establish medication management treatment with a psychiatric 

provider, establishes routine therapy appointments, and follow-up with primary 

care provider.  Verify patient understands and agrees to these recommendations.





10/04/18 06:43





Subjective: 


Following up with patient for evaluation of psychosis and safety.  Patient 

reports, "Doing good.  Trying to stay out of my room so I do not yell at myself 

in the mirror, and maybe the medication change will help with that too.  God 

bless or whatever else I said yesterday, you know.  Yeah, I think it is going 

well, yep thanks."  Patient expresses the following psychiatric symptoms severe 

anxiety.  Patient denies symptoms of doroteo.  Patient reports taking medications 

as prescribed, and describes response to medications as good.  Patient does not 

report undesirable side effects from the medications, and agrees to continue 

current medications.  Patient reports appetite as good, and reports eating all 

meals.  Patient describes getting 10 hours of sleep.  Patient agrees to 

continue Invega 6 mg po QD and agrees to Invega Sustenna STEIN with first loading 

dose on Friday. 





Objective: 





 Vital Signs











Temp Pulse Resp BP Pulse Ox


 


 36.9 C   70   14   113/57 L  94 


 


 10/03/18 06:00  10/03/18 06:00  10/03/18 06:00  10/03/18 06:00  10/03/18 06:00








NURSING REPORT: Consulted with nursing for update on patients progress in 

treatment.  Nurses report patient is engaged in treatment, is attending groups, 

slept 10.5 hours, expresses the following psychiatric symptoms: anxiety; 

exhibits the following psychiatric symptoms: hypertalkative, disorganized 

thought process; is eating all meals, is agreeable to medications and taking as 

prescribed with no report of side effects, with no s/s of EPS/akathisia, and 

denies SI/HI, denies A/V hallucinations, and denies delusions.





CASE COORDINATOR UPDATE: establishing discharge plan with [facility] 

for follow-up after discharge.





CONSULT WITH OUTPATIENT PSYCHIATRIST YESTERDAY: Outpatient psychiatrist from 

[facility] recommended to begin patient on Haldol DEC or Prolixin STEIN due to history 

of severe schizophrenia.  Reports patient has done well on Risperidal CONSTA in 

the past, and has a history of non-adherence to oral medications.  Communicated 

to [facility] will begin trial of Invega STEIN.





MSE: The patient is a well-nourished male looking stated chronological age.  

Attire is appropriate and dress is casual and is neat.  Grooming status is 

appropriate and neat.  Ambulation is independent.  Gait is normal and 

coordinated.  Posture is normal and relaxed.  Eye contact is appropriate, and 

adequate.  Motor activity is appropriate with purposeful, organized, 

coordinated movements; with no involuntary movements.  Attitude is cooperative 

and friendly.  Patient appears distractible and does not relate well to this 

interviewer.  Language production is spontaneous.  Rate if pressured and non-

stop, rhythm is fast, and volume is loud; amount is hypertalkative.  Patient 

difficult to interrupt and interrupts this NP during interview.  Articulation 

is clear.  Patient reports mood as okay with incongruent and expansive affect.  

Patients thought process is disorganized, non-linear, illogical, with loose 

associations, tangential thought.  Associations are loose.  Patient does not 

report suicidal/homicidal thoughts, ideas, or plans.  Patient denies auditory, 

visual hallucinations.  Patient denies delusions.  Patient does not appear to 

be attending to internal stimuli.  Patients attention and concentration are 

poor.  Patient is oriented to person, place, time, and situation.  Patients 

insight is poor.  Patients judgment is poor.  No evidence of gross cognitive 

dysfunction at any point during the interview.  No evidence of apparent 

dysfunction in recent or remote memory.





SUBSTANCE ABUSE BRIEF INTERVENTION: Brief intervention regarding the risks of 

cannabis abuse is provided to patient with goal to reduce the risk of harm that 

could result from the continued use of methamphetamine and cannabis, with the 

general aim to investigate the problem, raise awareness of problem, develop a 

solution with the patient, recommend a specific change or activity, and 

motivate the patient toward change.  Assess substance abuse behavior and give 

supportive advice about harm reduction, recommend a reduction in hazardous/at-

risk consumption patterns, and facilitate referrals for additional specialized 

treatment with care coordinator.  Intermediate goal is for the patient to quit 

use and attend NA meetings and OP substance abuse treatment.  Intervention 

focus on intermediate goals to allow for more immediate success in the 

treatment process to keep the patient motivated.  Review following with patient

: Cannabis use risks: Short-term use: impaired short-term memory, impaired 

motor coordination, altered judgement, in high doses paranoia and psychosis.  

Long-term use addiction, diminished life satisfaction and achievement, symptoms 

of chronic bronchitis, and increased risk of chronic psychosis disorders if 

predisposition to such disorders.  In withdrawal anger, aggression irritability

, anxiety and nervousness, decreased appetite or weight loss, restlessness, and 

sleep difficulties with strange dreams.  





Patient responds well to intervention, however, reports he plans to continue 

cannabis after discharge.  Will continue to educate risks.











- Time Spent With Patient


Time Spent With Patient: 


15 minutes, met with patient individually.








- Pending Discharge


Pending Discharge Within 24 Hours: No


Pending Discharge Within 48 Hours: No





ICD10 Worksheet


Patient Problems: 


 Problems











Problem Status Onset


 


Cannabis use disorder, severe, dependence Acute  


 


Nicotine dependence with withdrawal Acute  


 


Non-adherence to medical treatment Acute  


 


Severe bipolar disorder with psychotic features, mood-congruent Acute

## 2018-10-05 RX ADMIN — PALIPERIDONE SCH MG: 3 TABLET, EXTENDED RELEASE ORAL at 07:51

## 2018-10-05 RX ADMIN — NICOTINE POLACRILEX PRN MG: 2 GUM, CHEWING BUCCAL at 07:51

## 2018-10-05 RX ADMIN — NICOTINE POLACRILEX PRN MG: 2 GUM, CHEWING BUCCAL at 16:48

## 2018-10-05 RX ADMIN — NICOTINE SCH: 21 PATCH, EXTENDED RELEASE TOPICAL at 13:21

## 2018-10-05 NOTE — ASMTCMCOM
CM Note

 

CM Note                       

Notes:

Ct. was in his room when CC checked in. Ct. just showered and his clothes were scattered on the 

floor. Ct. reports feeling "pretty good".  Ct. stated he is attending some groups. Ct. answers were 


very short and he did not want to continue engaging in a conversation.

 

Date Signed:  10/05/2018 12:42 PM

Electronically Signed By:Ayla Santana

## 2018-10-05 NOTE — SOAPPROG
SOAP Progress Note


Assessment/Plan: 


Assessment:





Schizophrenia.  Cannabis Use Disorder, Severe.  Non-adherence to medical 

treatment.  Nicotine dependence with withdrawal.  R/O Bipolar Disorder, severe, 

current doroteo, with mood congruent psychotic features.  Slight improvement noted

; notably improved sleep, and mentation has improved with switch from Abilify 

to Invega (see subjective/objective note).  After further observation, patient 

is not showing signs of doroteo, and presents with s/s of schizophrenia.  Change 

working dx to schizophrenia and treat accordingly.  OP psychiatrist reports 

history of schizophrenia.  Patient is not safe to discharge at this time as 

patient continues to exhibit signs of psychosis, and express psychosis 

symptoms.  Patient requires continued inpatient care because of current acute 

psychosis, and requires inpatient level of care to stabilize in order to no 

longer be gravely disabled due to mental illness.  Patient could benefit from 

continued inpatient hospitalization for crisis stabilization, safety, and 

medication evaluation.  Patient could benefit from STEIN as patient has history 

of non-adherence to oral medications.  STEIN to be administered on Friday with 

second loading dose on Monday.  Patient to discharge after second loading dose.

  





Plan:





(1) Psychotropic medications: After reviewing options, risks, and benefits 

patient agrees to continue current medications and agrees to STEIN today with 

second loading dose on Monday.  Will continue Invega 6 mg po QD for acute 

stabilization.  No medication changes at this time as more time is needed to 

determine ongoing tolerability and efficacy.  Plan is to continue to observe 

patient for response and side effects from medications, and ongoing monitoring 

and evaluation.  


(2) Review with patient informed consent and recommendations for psychotropic 

medication treatment listed below


(3) Labs: no additional labs at this time


(4) Therapy: continue milieu and group therapy  


(5) Further investigation including gathering information from patients 

relatives and review of past case records to inform treatment plan.


(6) Safety/Wellness plan and follow-up outpatient appointments to be 

established prior to discharge.  Next steps are for patient to meet with care 

manager to plan a safe discharge plan and establish outpatient services for 

ongoing treatment.  


(7) Confer with inpatient treatment team regarding treatment plan.  


(8) Legal status: voluntary 


(9) Consider discharge on Monday or Tuesday if patient is in stable condition, 

safe, and has a safe discharge plan.   





PSYCHOTROPIC MEDICATION TREATMENT INFORMED CONSENT and RECOMMENDATIONS: Review 

nature of condition, diagnosis, and prognosis.  Review nature and purpose of 

psychotropic medication treatment.  Review type of psychotropic medications 

being ordered.  Review risk and benefits of psychotropic medication treatment.  

Review probable length of time patient will need to take medications.  Review 

risk and benefits of not undergoing psychotropic medication treatment.  Review 

alternative treatments to psychotropic medications.  Review psychotropic 

medications contraindications, drug-drug interactions, side effects, and 

importance of reporting any side effects to a psychiatric provider or nurse 

during inpatient hospitalization, and upon discharge to patients psychiatric 

outpatient provider, primary care provider, or other health care professional.  

Review importance of asking a nurse, psychiatric provider, or primary care 

provider any questions or problems concerning the psychotropic medications.  

Verify patient understands the information that has been provided, and 

understands, accepts, and agrees to psychotropic medications.  


Review patients safety plan and importance of patient to report to staff while 

hospitalized if patient is ever a danger to self/others, or unable to care for 

self, and upon discharge, the importance for patient to contact [facility] or [facility], or go to the nearest emergency room, if patient is ever a 

danger to self/others, or unable to care for self.  Recommend that upon 

discharge patient establish medication management treatment with a psychiatric 

provider, establishes routine therapy appointments, and follow-up with primary 

care provider.  Verify patient understands and agrees to these recommendations.








10/05/18 06:50





Subjective: 


Following up with patient for evaluation of psychosis and safety.  Patient 

reports, "Doing well, just needed to reincorporate from being here, was 

stressed out.  Needed to reconstitute myself, under a lot of stress because I 

live alone or something like that.  Yes, everything is going well."  Patient 

expresses the following psychiatric symptoms severe anxiety.  Patient reports 

taking medications as prescribed, and describes response to medications as 

good.  Patient does not report undesirable side effects from the medications, 

and agrees to continue current medications.  Patient reports appetite as good, 

and reports eating all meals.  Patient describes getting 10 hours of sleep.  

Patient agrees to continue Invega 6 mg po QD and agrees to Invega Sustenna STEIN 

with first loading dose today and second loading dose on Monday.  Patient 

agrees to continued hospitalization until beginning of next week for STEIN. 





Objective: 





 Vital Signs











Temp Pulse Resp BP Pulse Ox


 


 36.7 C   84   16   103/61   95 


 


 10/04/18 06:00  10/04/18 06:00  10/04/18 06:00  10/04/18 06:00  10/04/18 06:00








NURSING REPORT: Consulted with nursing for update on patients progress in 

treatment.  Nurses report patient is engaged in treatment, is attending groups, 

slept 10 hours, expresses the following psychiatric symptoms: anxiety; exhibits 

the following psychiatric symptoms: disorganized, illogical, non-linear; is 

eating all meals, is agreeable to medications and taking as prescribed with no 

report of side effects, with no s/s of EPS/akathisia, and denies SI/HI, denies A

/V hallucinations, and denies delusions.





CASE COORDINATOR UPDATE: establishing discharge plan with [facility] 

for follow-up after discharge.





MSE: The patient is a well-nourished male looking stated chronological age.  

Attire is appropriate and dress is casual and is neat.  Grooming status is 

appropriate and neat.  Ambulation is independent.  Gait is normal and 

coordinated.  Posture is normal and relaxed.  Eye contact is appropriate, and 

adequate.  Motor activity is appropriate with purposeful, organized, 

coordinated movements; with no involuntary movements.  Attitude is cooperative 

and friendly.  Patient appears distractible and does not relate well to this 

interviewer.  Language production is spontaneous.  Rate if pressured, rhythm is 

fast, and volume loud.  Articulation is clear.  Patient reports mood as okay 

with incongruent and expansive affect.  Patients thought process is disorganized

, non-linear, illogical, with loose associations, tangential thought.  Patient 

does not report suicidal/homicidal thoughts, ideas, or plans.  Patient denies 

auditory, visual hallucinations.  Patient denies delusions.  Patient does not 

appear to be attending to internal stimuli.  Patients attention and 

concentration are poor.  Patient is oriented to person, place, time.  

Orientation to situation is absent.  Patients insight is poor.  Patients 

judgment is poor.  No evidence of gross cognitive dysfunction at any point 

during the interview.  No evidence of apparent dysfunction in recent or remote 

memory.





SUBSTANCE ABUSE BRIEF INTERVENTION: Brief intervention regarding the risks of 

cannabis abuse is provided to patient with goal to reduce the risk of harm that 

could result from the continued use of methamphetamine and cannabis, with the 

general aim to investigate the problem, raise awareness of problem, develop a 

solution with the patient, recommend a specific change or activity, and 

motivate the patient toward change.  Assess substance abuse behavior and give 

supportive advice about harm reduction, recommend a reduction in hazardous/at-

risk consumption patterns, and facilitate referrals for additional specialized 

treatment with care coordinator.  Intermediate goal is for the patient to quit 

use and attend NA meetings and OP substance abuse treatment.  Intervention 

focus on intermediate goals to allow for more immediate success in the 

treatment process to keep the patient motivated.  Review following with patient

: Cannabis use risks: Short-term use: impaired short-term memory, impaired 

motor coordination, altered judgement, in high doses paranoia and psychosis.  

Long-term use addiction, diminished life satisfaction and achievement, symptoms 

of chronic bronchitis, and increased risk of chronic psychosis disorders if 

predisposition to such disorders.  In withdrawal anger, aggression irritability

, anxiety and nervousness, decreased appetite or weight loss, restlessness, and 

sleep difficulties with strange dreams.  











- Time Spent With Patient


Time Spent With Patient: 


15 minutes, met with patient individually.








- Pending Discharge


Pending Discharge Within 24 Hours: No


Pending Discharge Within 48 Hours: No





ICD10 Worksheet


Patient Problems: 


 Problems











Problem Status Onset


 


Cannabis use disorder, severe, dependence Acute  


 


Nicotine dependence with withdrawal Acute  


 


Non-adherence to medical treatment Acute  


 


Severe bipolar disorder with psychotic features, mood-congruent Acute

## 2018-10-06 RX ADMIN — NICOTINE POLACRILEX PRN MG: 2 GUM, CHEWING BUCCAL at 17:36

## 2018-10-06 RX ADMIN — NICOTINE SCH: 21 PATCH, EXTENDED RELEASE TOPICAL at 09:12

## 2018-10-06 RX ADMIN — NICOTINE POLACRILEX PRN MG: 2 GUM, CHEWING BUCCAL at 14:47

## 2018-10-06 RX ADMIN — NICOTINE POLACRILEX PRN MG: 2 GUM, CHEWING BUCCAL at 23:06

## 2018-10-06 RX ADMIN — PALIPERIDONE SCH MG: 3 TABLET, EXTENDED RELEASE ORAL at 09:08

## 2018-10-06 RX ADMIN — NICOTINE POLACRILEX PRN MG: 2 GUM, CHEWING BUCCAL at 06:55

## 2018-10-06 NOTE — SOAPPROG
SOAP Progress Note


Assessment/Plan: 


Assessment:





Per Miguelito Sun's recent note:


Assessment:





Schizophrenia.  Cannabis Use Disorder, Severe.  Non-adherence to medical 

treatment.  Nicotine dependence with withdrawal.  R/O Bipolar Disorder, severe, 

current doroteo, with mood congruent psychotic features.  Slight improvement noted

; notably improved sleep, and mentation has improved with switch from Abilify 

to Invega (see subjective/objective note).  After further observation, patient 

is not showing signs of doroteo, and presents with s/s of schizophrenia.  Change 

working dx to schizophrenia and treat accordingly.  OP psychiatrist reports 

history of schizophrenia.  Patient is not safe to discharge at this time as 

patient continues to exhibit signs of psychosis, and express psychosis 

symptoms.  Patient requires continued inpatient care because of current acute 

psychosis, and requires inpatient level of care to stabilize in order to no 

longer be gravely disabled due to mental illness.  Patient could benefit from 

continued inpatient hospitalization for crisis stabilization, safety, and 

medication evaluation.  Patient could benefit from STEIN as patient has history 

of non-adherence to oral medications.  STEIN to be administered on Friday with 

second loading dose on Monday.  Patient to discharge after second loading dose.

  











Plan:





10/06/18 16:24


1. Patient has agreed to STEIN Invega Sustenna. He received 1st dose IM on 10/5/

18. 


2. Patient denies any SE's or physical complaints after IM med administration. 

He is tolerating PO Invega w/o any adverse effects.


3. Patient plans to f/u with Dr. Gallegos at [facility] after d/c. 


4. Voluntary


Subjective: 


Patient sitting at desk in his room. Interviewed patient with Tammy ESPINOZA, 

[place]onado. He reports that he did not keep his OP appts with Dr. Gallegos. She 

tried to get him back on Risperdal Consta per patient's report, but he did not 

keep his appointments. Patient has no plan how to maintain compliance when he's 

on Invega Sustenna. He claims he will go for f/u injections once/month. However

, given his prior h/o cannabis dependence and drug use, it seems likely he will 

continue to use recreational drugs that exacerbate his psychotic sxs and impair 

his cognition/judgment making it more likely he will not adhere to any 

treatment plan. This is a risk and concern that must be addressed by his 

outpatient treatment team at [facility]. The IP team, including the CC and PMHNP, have 

communicated with Dr. Gallegos and the [facility] staff to alert them to this risk. 





Objective: 





 Vital Signs











Temp Pulse Resp BP Pulse Ox


 


 36.6 C   83   14   111/63   96 


 


 10/06/18 06:00  10/06/18 06:00  10/06/18 06:00  10/06/18 06:00  10/06/18 06:00








MSE: Affect: Flat  Mood: "OK"  TP: Goal-directed   TC: Denies any SI/HI   

Perception: Denies AH/VH  Insight/Judgment: Poor





- Time Spent With Patient


Time Spent With Patient: 


15"








- Pending Discharge


Pending Discharge Within 24 Hours: No


Pending Discharge Within 48 Hours: Yes


Pending Discharge Date: 10/08/18 (Possible d/c on Mon, will need to f/u with [facility]

)


Pending Discharge Time: 11:00





ICD10 Worksheet


Patient Problems: 


 Problems











Problem Status Onset


 


Cannabis use disorder, severe, dependence Acute  


 


Nicotine dependence with withdrawal Acute  


 


Non-adherence to medical treatment Acute  


 


Severe bipolar disorder with psychotic features, mood-congruent Acute

## 2018-10-06 NOTE — ASMTBHDC
Notes

 

Note:                         

Notes:

The patient met with this writer and the provider; who answered and clarified the patient's 

questions regarding the prescribed medication. The patient was calm and cooperative. He was 

oriented and organized in his thought process. [facility] staff reported that the patient seemed 

preoccupied in the milieu.  

 

Date Signed:  10/06/2018 02:56 PM

Electronically Signed By:Tammy Gomez

## 2018-10-07 RX ADMIN — PALIPERIDONE SCH MG: 3 TABLET, EXTENDED RELEASE ORAL at 08:41

## 2018-10-07 RX ADMIN — NICOTINE POLACRILEX PRN MG: 2 GUM, CHEWING BUCCAL at 13:23

## 2018-10-07 RX ADMIN — NICOTINE SCH: 21 PATCH, EXTENDED RELEASE TOPICAL at 08:51

## 2018-10-07 RX ADMIN — NICOTINE POLACRILEX PRN MG: 2 GUM, CHEWING BUCCAL at 16:56

## 2018-10-07 RX ADMIN — NICOTINE POLACRILEX PRN MG: 2 GUM, CHEWING BUCCAL at 08:59

## 2018-10-07 NOTE — SOAPPROG
SOAP Progress Note


Assessment/Plan: 


Assessment:





Per Miguelito Sun's recent note:


Assessment:





Schizophrenia.  Cannabis Use Disorder, Severe.  Non-adherence to medical 

treatment.  Nicotine dependence with withdrawal.  R/O Bipolar Disorder, severe, 

current doroteo, with mood congruent psychotic features.  Slight improvement noted

; notably improved sleep, and mentation has improved with switch from Abilify 

to Invega (see subjective/objective note).  After further observation, patient 

is not showing signs of doroteo, and presents with s/s of schizophrenia.  Change 

working dx to schizophrenia and treat accordingly.  OP psychiatrist reports 

history of schizophrenia.  Patient is not safe to discharge at this time as 

patient continues to exhibit signs of psychosis, and express psychosis 

symptoms.  Patient requires continued inpatient care because of current acute 

psychosis, and requires inpatient level of care to stabilize in order to no 

longer be gravely disabled due to mental illness.  Patient could benefit from 

continued inpatient hospitalization for crisis stabilization, safety, and 

medication evaluation.  Patient could benefit from STEIN as patient has history 

of non-adherence to oral medications.  STEIN to be administered on Friday with 

second loading dose on Monday.  Patient to discharge after second loading dose.

  











Plan:





10/06/18 16:24


1. Patient has agreed to STEIN Invega Sustenna. He received 1st dose IM on 10/5/

18. 


2. Patient denies any SE's or physical complaints after IM med administration. 

He is tolerating PO Invega w/o any adverse effects.


3. Patient plans to f/u with Dr. Gallegos at [facility] after d/c. 


4. Voluntary








PLAN:


10/07/18 14:26


1. Patient appears to be responding to internal/external stimuli. He was 

gesturing and talking to himself in dining area.


2. No SE's from PO or IM Invega. No signs of EPS.


3. Next dose of Sustenna IM on 10/12/18. Will need to have appt to receive this 

2nd injection at [facility] clinic since he will likely not be in hospital. MD 

explained that patient should continue to take PO Invega for another 3 weeks. 

His OP provider may want him to stay on it longer or come off sooner. Patient 

said he understood the plan. 


4. Voluntary 


Subjective: 


Patient denies AH/VH, but staff observed him in dining room talking to himself 

and gesturing with his hands when no one else was around. He slept 7.5 hrs and 

ate 100% of meals. He denies any SI/HI. MD explained that he will need 2nd 

injection of Invega Sustenna next Friday (one week after his 1st injection). He 

will need to f/u at [facility] for this 2nd injection. CC will make an appt. MD also 

explained that patient will need to continue to take PO Invega for up to 3 

weeks after discharge. He verbalized his understanding and agreed with this 

plan. 





Objective: 





 Vital Signs











Temp Pulse Resp BP Pulse Ox


 


 36.8 C   86   14   123/56 H  96 


 


 10/07/18 06:00  10/07/18 06:00  10/07/18 06:00  10/07/18 06:00  10/07/18 06:00








MSE: Affect: Euthymic  Mood: "OK"  TP: Goal-directed   TC: Denies any SI/HI  

Perception: Denies AH/VH, but staff noted patient has been responding to IS/ES  

Insight/Judgment: Poor based on prior h/o nonadherence





- Time Spent With Patient


Time Spent With Patient: 


15"








- Pending Discharge


Pending Discharge Within 24 Hours: Yes


Pending Discharge Date: 10/08/18 (Possible d/c on Mon once f/u appt for 2nd STEIN 

is confirmed)


Pending Discharge Time: 11:00





ICD10 Worksheet


Patient Problems: 


 Problems











Problem Status Onset


 


Cannabis use disorder, severe, dependence Acute  


 


Nicotine dependence with withdrawal Acute  


 


Non-adherence to medical treatment Acute  


 


Severe bipolar disorder with psychotic features, mood-congruent Acute

## 2018-10-08 VITALS — DIASTOLIC BLOOD PRESSURE: 59 MMHG | SYSTOLIC BLOOD PRESSURE: 119 MMHG

## 2018-10-08 RX ADMIN — NICOTINE POLACRILEX PRN MG: 2 GUM, CHEWING BUCCAL at 09:30

## 2018-10-08 RX ADMIN — PALIPERIDONE SCH MG: 3 TABLET, EXTENDED RELEASE ORAL at 08:23

## 2018-10-08 RX ADMIN — NICOTINE SCH: 21 PATCH, EXTENDED RELEASE TOPICAL at 10:05

## 2018-10-08 NOTE — BDS
REASON FOR ADMISSION:  From the ED note dated 09/28/2018, the patient arrived 
at the ED on an M1 hold from [facility] due to paranoid delusions 
and poor self-care, nonadherence to treatment, and was placed on an M1 hold for 
grave disability due to underlying mental illness of schizophrenia.  The 
patient denied suicidal or homicidal ideation at time of presentation at the 
ED.  Denied self-injurious behavior.  Denied burning, cutting, or similar self-
injurious behavior.  The patient was admitted involuntarily on an M1 hold due 
to being gravely disabled due to a mental illness.  The patient was admitted 
for safety crisis stabilization and medication evaluation.



ADMITTING DIAGNOSES:  

1.  Schizophrenia.

2.  Cannabis use disorder, severe.  

3.  Nicotine dependence with withdrawal.

4.  Nonadherence to medical treatment.



ADMISSION PHYSICAL EXAM:  The patient was seen on 09/28/2018, for history and 
physical for medical clearance for inpatient Behavioral Health stay.  The 
patient was medically cleared for inpatient psychiatric hospitalization and 
treatment. For further details, please refer to history and physical dated 09/28
/2018.



ADMISSION LABS:  CBC from 09/28/2018, within normal limits except hemoglobin 
was elevated at 19.1, hematocrit was elevated at 54.1, eosinophils were low at 
0.4.  BMP from 09/28/2018, was within normal limits except glucose was low at 
49.  Liver function from 10/01/2018, within normal limits.  Hemoglobin A1c from 
10/01/2018, was within normal limits at 5.2.  Fasting lipid panel from 10/01/
2018, within normal limits except triglycerides were elevated at 207, and VLDL 
cholesterol was elevated at 41.  Toxicology screen from 09/28/2018, was 
negative for substances tested and negative for ethyl alcohol.



MAJOR PROCEDURES OR TESTS:  None.



HOSPITAL COURSE:  The most prominent symptoms and behaviors while the patient 
was here were disorganized behavior and disorganized thought process.  
Treatment target symptoms were acute psychosis.  Treatment modalities utilized 
were milieu and group therapy.  Invega 6 mg p.o. daily was started to target 
acute psychosis symptoms, was tolerated with no report of side effects and with 
good response.  The patient has a long history of medication nonadherence and 
patient agreed to Invega Sustenna with the first loading dose of 234 mg IM 
administered on Friday, 10/05/2018, and the 2nd loading dose of 156 mg IM was 
given prior to discharge today.  The patient is to continue Invega 6 mg p.o. 
daily for acute psychosis stabilization and with plan to have medication 
tapered and discontinued in an outpatient setting once patient completely 
stabilizes.  The patient has improved considerably with no signs of psychiatric 
symptoms and no psychiatric symptoms expressed at discharge.  The patient 
reports he has improved since admission.  States to be in stable condition.  
Feels safe to discharge and he contracts for safety.  Patient's response to 
treatment was good.  There were no adverse or unexpected results of treatment.  
The patient was safe throughout his stay, active in treatment, attended and 
engaged in groups, and was appropriate with staff and other patients.  The 
patient met with the treatment team prior to discharge to assess readiness to 
discharge and reviewed discharge plan.  The treatment team consensus is the 
patient is in stable condition, has a safe discharge plan and is ready to 
discharge today.



CONDITION AT DISCHARGE:  Patient is in stable condition and is no longer a 
danger to self or others, and is not gravely disabled due to mental illness.  
Patient is no longer in need of inpatient level of care, and can be safely and 
effectively treated within the community.  The patients level of risk at time 
of discharge is low.  MSE: The patient is casually dressed and with good hygiene
, and looks stated age.  Patient is sitting, posture is upright, and position 
is relaxed.  Patient appears awake, alert, and responds appropriately and 
reasonably during interview.  Patient is engaged, relates well to interviewer, 
and emotional facial expression is appropriate to situation and changes 
appropriately with topic.  Patient is cooperative, makes comfortable eye contact
, and movements are voluntary, deliberate, coordinated, and smooth and even 
with no inappropriate movements.  Patient makes laryngeal sounds effortlessly 
and shares conversation appropriately; pace of conversation is appropriate, and 
stream of talking is fluent; articulation is clear and understandable; word 
choice is effortless and appropriate for education level; completes sentences, 
occasionally pausing to think; rate and volume are appropriate for interview 
and setting.  Patient reports mood as euthymic.  Patients affect is stable with 
full variable range, congruent with mood, and appropriate to speech and 
circumstances.  Patient has linear and logical thinking, with no loose 
associations, tangential thought, thought blocking, concrete thinking, or any 
other signs of formal thought disorder.  Patient denies suicidal and homicidal 
ideation, and denies hallucinations and delusions.  Patient appears to be a 
reliable historian with sound judgement and good insight into current 
condition.  Patient has no apparent dysfunction in recent or remote memory noted
, and no evidence of gross cognitive dysfunction noted at any point during the 
interview.



DISCHARGE DIAGNOSES:  

1.  Schizophrenia.

2.  Cannabis use disorder, severe.  

3.  Nicotine dependence with withdrawal.



CURRENT MEDICATIONS:  After reviewing options, risks and benefits with the 
patient,  the patient agrees to continue Invega 6 mg p.o. daily.  The patient 
requests prescription for Invega 6 mg p.o. daily at time of discharge.  
Prescription for 30 days is provided.  The prescription is reviewed with the 
patient at time of discharge to ensure accuracy and patient understanding.



DISPOSITION:  The patient left hospital independently and voluntarily and plans 
to stay in a hotel until he can secure a long-term apartment in the [facility].



FOLLOWUP:  Care coordinator reports the appropriate outpatient follow-up 
services have been established and outpatient appointments have been scheduled.
  The patient received written instructions with times and dates of outpatient 
follow-up appointments.  The following follow-up recommendations were provided 
to the patient at discharge: Continue psychotropic medications as prescribed 
and attend appointments as scheduled.  Report any side effects to a psychiatric 
outpatient provider, a primary care provider, or other health care 
professional.  Address any questions or problems concerning the psychotropic 
medications with a psychiatric outpatient provider, a primary care provider, or 
other health care professional.  Contact [facility] or [facility], or go 
to the nearest emergency room, if you are ever a danger to yourself/others, or 
unable to care for yourself.  As soon as possible, establish a routine 
medication management treatment with a psychiatric provider, establish routine 
therapy appointments, and follow-up with a primary care provider.  



SUBSTANCE ABUSE BRIEF INTERVENTION: Brief intervention regarding the risks of 
cannabis abuse is provided to patient with goal to reduce the risk of harm that 
could result from the continued use of cannabis, with the general aim to 
investigate the problem, raise awareness of problem, develop a solution with 
the patient, recommend a specific change or activity, and motivate the patient 
toward change.  Assess substance abuse behavior and give supportive advice 
about harm reduction, recommend a reduction in hazardous/at-risk consumption 
patterns, and facilitate referrals for additional specialized treatment with 
care coordinator.  Intermediate goal is for the patient to quit use and attend 
outpatient substance abuse treatment.  Intervention focus on intermediate goals 
to allow for more immediate success in the treatment process to keep the 
patient motivated.  Review following with patient: Cannabis use risks: Short-
term use: impaired short-term memory, impaired motor coordination, altered 
judgement, in high doses paranoia and psychosis.  Long-term use addiction, 
diminished life satisfaction and achievement, symptoms of chronic bronchitis, 
and increased risk of chronic psychosis disorders if predisposition to such 
disorders.  In withdrawal anger, aggression irritability, anxiety and 
nervousness, decreased appetite or weight loss, restlessness, and sleep 
difficulties with strange dreams.  



OUTPATIENT SUBSTANCE ABUSE TREATMENT: Patient referred to outpatient provider 
and treatment for continued treatment related to substance abuse.



LEGAL COURSE:  The patient was admitted on an M1 hold for involuntary inpatient 
psychiatric hospitalization.  The patient became voluntary during his stay and 
patient discharged today independently and voluntarily.



ATTITUDE AT TIME OF DISCHARGE:  The patients attitude was positive at time of 
discharge, and patient reports looking forward to discharging today.  The 
patient reports he feels safe to discharge, is no longer a danger to himself or 
others, is in stable condition, and contracts for safety.  Patient states he 
will continue medications as prescribed, and establish medication management 
treatment with an outpatient provider after discharge.  Patient reports he 
understands the information that has been provided to him, and he understands, 
accepts, and agrees to psychotropic medications.  Patient describes internal 
protective factors as the coping skills he has learned while hospitalized here, 
and he plans to continue to practice these coping skills after discharge.  



LABS AND RADIOLOGY STUDIES:  There were no pending labs or studies at time.



ADVANCED DIRECTIVES:  There were no advance directives on file, and the patient 
was full code during this hospitalization.



The following psychotropic medication treatment informed consent and 
recommendations were provided to the patient at time of discharge.  Patient 
reports he understands, accepts, and agrees to the information that has been 
provided.   



PSYCHOTROPIC MEDICATION TREATMENT INFORMED CONSENT and RECOMMENDATIONS: Review 
nature of condition, diagnosis, and prognosis.  Review nature and purpose of 
psychotropic medication treatment. Review type of psychotropic medications 
being prescribed.  Review risk and benefits of psychotropic medication 
treatment.  Review probable length of time will need to take medications.  
Review risk and benefits of not undergoing psychotropic medication treatment.  
Review alternative treatments to psychotropic medications.  Review psychotropic 
medications contraindications, side effects, and importance of reporting any 
side effects to a psychiatric provider, primary care provider, or other health 
care professional.  Review importance of her asking a psychiatric provider or 
primary care provider any questions or problems concerning the psychotropic 
medications.  



Review safety plan and the importance to contact [facility] or [facility]
, or go to the nearest emergency room, if ever a danger to yourself/others, or 
unable to care for yourself.  Recommend upon discharge to establish routine 
medication management treatment with a psychiatric provider, establish routine 
therapy appointments, and follow-up with a primary care provider.  Verify 
patient understands, accepts, and agrees to the information that has been 
provided. 



Job #:  [id number]

MTDD